# Patient Record
Sex: FEMALE | Race: AMERICAN INDIAN OR ALASKA NATIVE
[De-identification: names, ages, dates, MRNs, and addresses within clinical notes are randomized per-mention and may not be internally consistent; named-entity substitution may affect disease eponyms.]

---

## 2019-03-15 ENCOUNTER — HOSPITAL ENCOUNTER (EMERGENCY)
Dept: HOSPITAL 5 - ED | Age: 34
Discharge: HOME | End: 2019-03-15
Payer: COMMERCIAL

## 2019-03-15 VITALS — SYSTOLIC BLOOD PRESSURE: 109 MMHG | DIASTOLIC BLOOD PRESSURE: 65 MMHG

## 2019-03-15 DIAGNOSIS — J45.909: ICD-10-CM

## 2019-03-15 DIAGNOSIS — K29.70: Primary | ICD-10-CM

## 2019-03-15 DIAGNOSIS — F17.200: ICD-10-CM

## 2019-03-15 LAB
ALBUMIN SERPL-MCNC: 4 G/DL (ref 3.9–5)
ALT SERPL-CCNC: 17 UNITS/L (ref 7–56)
BASOPHILS # (AUTO): 0.1 K/MM3 (ref 0–0.1)
BASOPHILS NFR BLD AUTO: 1.1 % (ref 0–1.8)
BILIRUB UR QL STRIP: (no result)
BLOOD UR QL VISUAL: (no result)
BUN SERPL-MCNC: 14 MG/DL (ref 7–17)
BUN/CREAT SERPL: 18 %
CALCIUM SERPL-MCNC: 8.7 MG/DL (ref 8.4–10.2)
EOSINOPHIL # BLD AUTO: 0.3 K/MM3 (ref 0–0.4)
EOSINOPHIL NFR BLD AUTO: 3.5 % (ref 0–4.3)
HCT VFR BLD CALC: 35.6 % (ref 30.3–42.9)
HEMOLYSIS INDEX: 9
HGB BLD-MCNC: 11.9 GM/DL (ref 10.1–14.3)
LYMPHOCYTES # BLD AUTO: 1.8 K/MM3 (ref 1.2–5.4)
LYMPHOCYTES NFR BLD AUTO: 24.1 % (ref 13.4–35)
MCHC RBC AUTO-ENTMCNC: 34 % (ref 30–34)
MCV RBC AUTO: 78 FL (ref 79–97)
MONOCYTES # (AUTO): 0.8 K/MM3 (ref 0–0.8)
MONOCYTES % (AUTO): 10.9 % (ref 0–7.3)
PH UR STRIP: 5 [PH] (ref 5–7)
PLATELET # BLD: 415 K/MM3 (ref 140–440)
PROT UR STRIP-MCNC: (no result) MG/DL
RBC # BLD AUTO: 4.55 M/MM3 (ref 3.65–5.03)
RBC #/AREA URNS HPF: < 1 /HPF (ref 0–6)
UROBILINOGEN UR-MCNC: < 2 MG/DL (ref ?–2)
WBC #/AREA URNS HPF: 1 /HPF (ref 0–6)

## 2019-03-15 PROCEDURE — 85025 COMPLETE CBC W/AUTO DIFF WBC: CPT

## 2019-03-15 PROCEDURE — 80053 COMPREHEN METABOLIC PANEL: CPT

## 2019-03-15 PROCEDURE — 36415 COLL VENOUS BLD VENIPUNCTURE: CPT

## 2019-03-15 PROCEDURE — 84703 CHORIONIC GONADOTROPIN ASSAY: CPT

## 2019-03-15 PROCEDURE — 99284 EMERGENCY DEPT VISIT MOD MDM: CPT

## 2019-03-15 PROCEDURE — 74022 RADEX COMPL AQT ABD SERIES: CPT

## 2019-03-15 PROCEDURE — 81001 URINALYSIS AUTO W/SCOPE: CPT

## 2019-03-15 NOTE — EMERGENCY DEPARTMENT REPORT
ED Abdominal Pain HPI





- General


Chief Complaint: Abdominal Pain


Stated Complaint: ABD PAIN


Time Seen by Provider: 03/15/19 12:52


Source: patient


Mode of arrival: Ambulatory


Limitations: No Limitations





- History of Present Illness


Initial Comments: 





Patient is a 33-year-old black female who is presenting with some abdominal 

discomfort.  Patient states is mostly in the left upper quadrant and suprapubic 

region.  She states is sensitive to touch as well as laying on her left side.  

She states is crampy in nature off and on.  She denies any nausea vomiting 

diarrhea cough, congestion and fevers chills dysuria and no vaginal discharge or

abnormal vaginal bleeding.


Severity scale (0 -10): 10





- Related Data


                                  Previous Rx's











 Medication  Instructions  Recorded  Last Taken  Type


 


Docusate Sodium [Colace] 100 mg PO BID #60 capsule 11/18/18 Unknown Rx


 


Ferrous Sulfate [Feosol 325 MG tab] 325 mg PO BID #60 tablet 11/18/18 Unknown Rx


 


HYDROcodone/ACETAMINOPHEN [Norco 1 each PO BID PRN #10 tablet 11/18/18 Unknown 

Rx





7.5-325 Tablet]    


 


Ondansetron [Zofran Odt] 4 mg PO Q8HR PRN #15 tab.rapdis 11/18/18 Unknown Rx


 


Famotidine [Pepcid] 40 mg PO QHS #10 tablet 03/15/19 Unknown Rx


 


traMADol [Ultram] 50 mg PO Q6HR PRN #12 tablet 03/15/19 Unknown Rx











                                    Allergies











Allergy/AdvReac Type Severity Reaction Status Date / Time


 


No Known Allergies Allergy   Verified 03/15/19 12:29














ED Review of Systems


ROS: 


Stated complaint: ABD PAIN


Other details as noted in HPI





Comment: All other systems reviewed and negative





ED Past Medical Hx





- Past Medical History


Hx Asthma: Yes


Additional medical history: Ovarian cyst, uterine fibroids, Bilateral Leg 

lymphadema.  Hemorroids





- Surgical History


Additional Surgical History: BREAST REDUCTION





- Social History


Smoking Status: Current Every Day Smoker


Substance Use Type: Alcohol





- Medications


Home Medications: 


                                Home Medications











 Medication  Instructions  Recorded  Confirmed  Last Taken  Type


 


Docusate Sodium [Colace] 100 mg PO BID #60 capsule 11/18/18  Unknown Rx


 


Ferrous Sulfate [Feosol 325 MG tab] 325 mg PO BID #60 tablet 11/18/18  Unknown 

Rx


 


HYDROcodone/ACETAMINOPHEN [Norco 1 each PO BID PRN #10 tablet 11/18/18  Unknown 

Rx





7.5-325 Tablet]     


 


Ondansetron [Zofran Odt] 4 mg PO Q8HR PRN #15 tab.rapdis 11/18/18  Unknown Rx


 


Famotidine [Pepcid] 40 mg PO QHS #10 tablet 03/15/19  Unknown Rx


 


traMADol [Ultram] 50 mg PO Q6HR PRN #12 tablet 03/15/19  Unknown Rx














ED Physical Exam





- General


Limitations: No Limitations


General appearance: alert, in no apparent distress





- Head


Head exam: Present: atraumatic, normocephalic





- Eye


Eye exam: Present: normal appearance





- ENT


ENT exam: Present: mucous membranes moist





- Neck


Neck exam: Present: normal inspection





- Respiratory


Respiratory exam: Present: normal lung sounds bilaterally.  Absent: respiratory 

distress, wheezes, rales, rhonchi





- Cardiovascular


Cardiovascular Exam: Present: regular rate, normal rhythm.  Absent: systolic 

murmur, diastolic murmur, rubs, gallop





- GI/Abdominal


GI/Abdominal exam: Present: soft, tenderness (mild tenderness to palpation to 

the left upper quadrant and epigastrium), normal bowel sounds.  Absent: 

distended, guarding, rebound, rigid





- Extremities Exam


Extremities exam: Present: normal inspection





- Back Exam


Back exam: Present: normal inspection





- Neurological Exam


Neurological exam: Present: alert, oriented X3





- Psychiatric


Psychiatric exam: Present: normal affect, normal mood





- Skin


Skin exam: Present: warm, dry, intact, normal color.  Absent: rash





ED Course





                                   Vital Signs











  03/15/19





  12:29


 


Temperature 97.6 F


 


Pulse Rate 84


 


Respiratory 18





Rate 


 


Blood Pressure 109/65


 


O2 Sat by Pulse 98





Oximetry 














ED Medical Decision Making





- Lab Data


Result diagrams: 


                                 03/15/19 12:55





                                 03/15/19 12:55








                                   Lab Results











  03/15/19 03/15/19 03/15/19 Range/Units





  12:48 12:55 12:55 


 


WBC   7.3   (4.5-11.0)  K/mm3


 


RBC   4.55   (3.65-5.03)  M/mm3


 


Hgb   11.9   (10.1-14.3)  gm/dl


 


Hct   35.6   (30.3-42.9)  %


 


MCV   78 L   (79-97)  fl


 


MCH   26 L   (28-32)  pg


 


MCHC   34   (30-34)  %


 


RDW   16.8 H   (13.2-15.2)  %


 


Plt Count   415   (140-440)  K/mm3


 


Lymph % (Auto)   24.1   (13.4-35.0)  %


 


Mono % (Auto)   10.9 H   (0.0-7.3)  %


 


Eos % (Auto)   3.5   (0.0-4.3)  %


 


Baso % (Auto)   1.1   (0.0-1.8)  %


 


Lymph #   1.8   (1.2-5.4)  K/mm3


 


Mono #   0.8   (0.0-0.8)  K/mm3


 


Eos #   0.3   (0.0-0.4)  K/mm3


 


Baso #   0.1   (0.0-0.1)  K/mm3


 


Seg Neutrophils %   60.4   (40.0-70.0)  %


 


Seg Neutrophils #   4.4   (1.8-7.7)  K/mm3


 


Sodium    136 L  (137-145)  mmol/L


 


Potassium    3.9  (3.6-5.0)  mmol/L


 


Chloride    101.1  ()  mmol/L


 


Carbon Dioxide    23  (22-30)  mmol/L


 


Anion Gap    16  mmol/L


 


BUN    14  (7-17)  mg/dL


 


Creatinine    0.8  (0.7-1.2)  mg/dL


 


Estimated GFR    > 60  ml/min


 


BUN/Creatinine Ratio    18  %


 


Glucose    130 H  ()  mg/dL


 


Calcium    8.7  (8.4-10.2)  mg/dL


 


Total Bilirubin    0.30  (0.1-1.2)  mg/dL


 


AST    24  (5-40)  units/L


 


ALT    17  (7-56)  units/L


 


Alkaline Phosphatase    41  ()  units/L


 


Total Protein    6.9  (6.3-8.2)  g/dL


 


Albumin    4.0  (3.9-5)  g/dL


 


Albumin/Globulin Ratio    1.4  %


 


HCG, Qual     (Negative)  


 


Urine Color  Yellow    (Yellow)  


 


Urine Turbidity  Clear    (Clear)  


 


Urine pH  5.0    (5.0-7.0)  


 


Ur Specific Gravity  1.021    (1.003-1.030)  


 


Urine Protein  <15 mg/dl    (Negative)  mg/dL


 


Urine Glucose (UA)  Neg    (Negative)  mg/dL


 


Urine Ketones  Neg    (Negative)  mg/dL


 


Urine Blood  Neg    (Negative)  


 


Urine Nitrite  Neg    (Negative)  


 


Urine Bilirubin  Neg    (Negative)  


 


Urine Urobilinogen  < 2.0    (<2.0)  mg/dL


 


Ur Leukocyte Esterase  Neg    (Negative)  


 


Urine WBC (Auto)  1.0    (0.0-6.0)  /HPF


 


Urine RBC (Auto)  < 1.0    (0.0-6.0)  /HPF


 


U Epithel Cells (Auto)  1.0    (0-13.0)  /HPF














  03/15/19 Range/Units





  12:55 


 


WBC   (4.5-11.0)  K/mm3


 


RBC   (3.65-5.03)  M/mm3


 


Hgb   (10.1-14.3)  gm/dl


 


Hct   (30.3-42.9)  %


 


MCV   (79-97)  fl


 


MCH   (28-32)  pg


 


MCHC   (30-34)  %


 


RDW   (13.2-15.2)  %


 


Plt Count   (140-440)  K/mm3


 


Lymph % (Auto)   (13.4-35.0)  %


 


Mono % (Auto)   (0.0-7.3)  %


 


Eos % (Auto)   (0.0-4.3)  %


 


Baso % (Auto)   (0.0-1.8)  %


 


Lymph #   (1.2-5.4)  K/mm3


 


Mono #   (0.0-0.8)  K/mm3


 


Eos #   (0.0-0.4)  K/mm3


 


Baso #   (0.0-0.1)  K/mm3


 


Seg Neutrophils %   (40.0-70.0)  %


 


Seg Neutrophils #   (1.8-7.7)  K/mm3


 


Sodium   (137-145)  mmol/L


 


Potassium   (3.6-5.0)  mmol/L


 


Chloride   ()  mmol/L


 


Carbon Dioxide   (22-30)  mmol/L


 


Anion Gap   mmol/L


 


BUN   (7-17)  mg/dL


 


Creatinine   (0.7-1.2)  mg/dL


 


Estimated GFR   ml/min


 


BUN/Creatinine Ratio   %


 


Glucose   ()  mg/dL


 


Calcium   (8.4-10.2)  mg/dL


 


Total Bilirubin   (0.1-1.2)  mg/dL


 


AST   (5-40)  units/L


 


ALT   (7-56)  units/L


 


Alkaline Phosphatase   ()  units/L


 


Total Protein   (6.3-8.2)  g/dL


 


Albumin   (3.9-5)  g/dL


 


Albumin/Globulin Ratio   %


 


HCG, Qual  Negative  (Negative)  


 


Urine Color   (Yellow)  


 


Urine Turbidity   (Clear)  


 


Urine pH   (5.0-7.0)  


 


Ur Specific Gravity   (1.003-1.030)  


 


Urine Protein   (Negative)  mg/dL


 


Urine Glucose (UA)   (Negative)  mg/dL


 


Urine Ketones   (Negative)  mg/dL


 


Urine Blood   (Negative)  


 


Urine Nitrite   (Negative)  


 


Urine Bilirubin   (Negative)  


 


Urine Urobilinogen   (<2.0)  mg/dL


 


Ur Leukocyte Esterase   (Negative)  


 


Urine WBC (Auto)   (0.0-6.0)  /HPF


 


Urine RBC (Auto)   (0.0-6.0)  /HPF


 


U Epithel Cells (Auto)   (0-13.0)  /HPF














- Radiology Data


Radiology results: report reviewed (x-ray of the abdomen shows no acute process)





- Medical Decision Making





Patient's laboratory studies and urinalysis showed no acute process.  Patient 

likely with gastritis or peptic ulcer disease.  Patient given this was 

symptomatically be discharged home.


Critical care attestation.: 


If time is entered above; I have spent that time in minutes in the direct care 

of this critically ill patient, excluding procedure time.








ED Disposition


Clinical Impression: 


Gastritis


Qualifiers:


 Gastritis type: unspecified gastritis Chronicity: acute Gastritis bleeding: 

without bleeding Qualified Code(s): K29.00 - Acute gastritis without bleeding





Abdominal pain


Qualifiers:


 Abdominal location: left upper quadrant Qualified Code(s): R10.12 - Left upper 

quadrant pain





Disposition: DC-01 TO HOME OR SELFCARE


Is pt being admited?: No


Does the pt Need Aspirin: No


Condition: Stable


Instructions:  Abdominal Pain (ED)


Referrals: 


[facility] MD [Primary Care Provider] - 3-5 Days


KAYLEE HANNON MD [Staff Physician] - 3-5 Days


Time of Disposition: 14:50

## 2019-03-15 NOTE — XRAY REPORT
ABDOMINAL SERIES



INDICATION: LUQ pain.



COMPARISON: None similar.



FINDINGS: Abdominal series, three radiographs, demonstrate 

nonobstructive bowel gas pattern without focal suspicious 

calcifications, pneumatosis or pneumoperitoneum.



Accompanying chest radiograph demonstrates slight exaggerated heart 

size.  Normal mediastinal and hilar contours. Clear lungs.



No acute osseous process.



CONCLUSION: No acute radiographic abnormality, as described.



Thank you for the opportunity to participate in this patient's care.

## 2019-07-04 ENCOUNTER — HOSPITAL ENCOUNTER (EMERGENCY)
Dept: HOSPITAL 5 - ED | Age: 34
Discharge: HOME | End: 2019-07-04
Payer: COMMERCIAL

## 2019-07-04 VITALS — DIASTOLIC BLOOD PRESSURE: 69 MMHG | SYSTOLIC BLOOD PRESSURE: 110 MMHG

## 2019-07-04 DIAGNOSIS — D25.9: ICD-10-CM

## 2019-07-04 DIAGNOSIS — N93.8: Primary | ICD-10-CM

## 2019-07-04 DIAGNOSIS — F17.200: ICD-10-CM

## 2019-07-04 DIAGNOSIS — J45.909: ICD-10-CM

## 2019-07-04 DIAGNOSIS — Z87.19: ICD-10-CM

## 2019-07-04 DIAGNOSIS — Z79.899: ICD-10-CM

## 2019-07-04 DIAGNOSIS — N83.209: ICD-10-CM

## 2019-07-04 LAB
BILIRUB UR QL STRIP: (no result)
BLOOD UR QL VISUAL: (no result)
BUN SERPL-MCNC: 11 MG/DL (ref 7–17)
BUN/CREAT SERPL: 18 %
CALCIUM SERPL-MCNC: 8.9 MG/DL (ref 8.4–10.2)
HCT VFR BLD CALC: 28.8 % (ref 30.3–42.9)
HEMOLYSIS INDEX: 0
HGB BLD-MCNC: 9.4 GM/DL (ref 10.1–14.3)
MCHC RBC AUTO-ENTMCNC: 33 % (ref 30–34)
MCV RBC AUTO: 63 FL (ref 79–97)
PH UR STRIP: 8 [PH] (ref 5–7)
PLATELET # BLD: 651 K/MM3 (ref 140–440)
PROT UR STRIP-MCNC: (no result) MG/DL
RBC # BLD AUTO: 4.55 M/MM3 (ref 3.65–5.03)
RBC #/AREA URNS HPF: 2 /HPF (ref 0–6)
UROBILINOGEN UR-MCNC: < 2 MG/DL (ref ?–2)
WBC #/AREA URNS HPF: < 1 /HPF (ref 0–6)

## 2019-07-04 PROCEDURE — 81001 URINALYSIS AUTO W/SCOPE: CPT

## 2019-07-04 PROCEDURE — 85027 COMPLETE CBC AUTOMATED: CPT

## 2019-07-04 PROCEDURE — 36415 COLL VENOUS BLD VENIPUNCTURE: CPT

## 2019-07-04 PROCEDURE — 80048 BASIC METABOLIC PNL TOTAL CA: CPT

## 2019-07-04 PROCEDURE — 84702 CHORIONIC GONADOTROPIN TEST: CPT

## 2019-07-04 NOTE — EMERGENCY DEPARTMENT REPORT
ED General Adult HPI





- General


Chief complaint: Abdominal Pain


Stated complaint: LOWER ABDOMINAL PAIN


Time Seen by Provider: 07/04/19 20:02


Source: patient


Mode of arrival: Ambulatory


Limitations: No Limitations





- History of Present Illness


Initial comments: 





Patient is a nulliparous 34-year-old -American female with no past 

medical history except asthma and presents to the ED with complaint of 

persistent vaginal discharge and pelvic pain for over 3 months.  Patient states 

that her OB/GYN physician started her on a birth control pill to help regulate 

her menstrual cycle over 3 months ago but states that the symptoms are 

persistent.  Patient states that she also has a history of uterine fibroids that

have persistently made her menstrual cycle irregular.  Patient denies abdominal 

pain, nausea, vomiting, diarrhea, dysuria, urinary frequency and urgency, 

vaginal discharge or low back pain and dizziness.


MD Complaint: pelvic pain, vaginal bleeding


-: Gradual, month(s) (> 3 months)


Location: abdomen, pelvis


Radiation: non-radiation


Severity scale (0 -10): 5


Quality: aching, sharp


Consistency: constant


Improves with: none


Worsens with: none


Associated Symptoms: denies other symptoms.  denies: confusion, chest pain, 

diaphoresis, fever/chills, headaches, malaise, nausea/vomiting, rash, shortness 

of breath, syncope, weakness


Treatments Prior to Arrival: none





- Related Data


                                  Previous Rx's











 Medication  Instructions  Recorded  Last Taken  Type


 


Docusate Sodium [Colace] 100 mg PO BID #60 capsule 11/18/18 Unknown Rx


 


Ferrous Sulfate [Feosol 325 MG tab] 325 mg PO BID #60 tablet 11/18/18 Unknown Rx


 


HYDROcodone/ACETAMINOPHEN [Norco 1 each PO BID PRN #10 tablet 11/18/18 Unknown 

Rx





7.5-325 Tablet]    


 


Famotidine [Pepcid] 40 mg PO QHS #10 tablet 03/15/19 Unknown Rx


 


Ondansetron [Zofran ODT TAB] 4 mg PO Q8HR PRN #15 tab.rapdis 07/04/19 Unknown Rx


 


traMADol [Ultram 50 MG tab] 50 mg PO Q6HR PRN #12 tablet 07/04/19 Unknown Rx











                                    Allergies











Allergy/AdvReac Type Severity Reaction Status Date / Time


 


No Known Allergies Allergy   Verified 03/15/19 12:29














ED Review of Systems


ROS: 


Stated complaint: LOWER ABDOMINAL PAIN


Other details as noted in HPI





Constitutional: denies: chills, fever


Eyes: denies: eye pain, eye discharge, vision change


ENT: denies: ear pain, throat pain


Respiratory: denies: cough, shortness of breath, wheezing


Cardiovascular: denies: chest pain, palpitations


Endocrine: no symptoms reported


Gastrointestinal: abdominal pain (LOWER ABDOMINAL PAIN), nausea.  denies: 

diarrhea


Genitourinary: hematuria, abnormal menses, other (Vaginal bleeding).  denies: 

urgency, dysuria, discharge


Musculoskeletal: denies: back pain, joint swelling, arthralgia


Skin: denies: rash, lesions


Neurological: denies: headache, weakness, paresthesias


Psychiatric: denies: anxiety, depression


Hematological/Lymphatic: denies: easy bleeding, easy bruising





ED Past Medical Hx





- Past Medical History


Previous Medical History?: Yes


Hx Asthma: Yes


Additional medical history: Ovarian cyst, uterine fibroids, Bilateral Leg 

lymphadema.  Hemorroids





- Surgical History


Past Surgical History?: Yes


Additional Surgical History: BREAST REDUCTION





- Social History


Smoking Status: Current Some Day Smoker


Substance Use Type: None





- Medications


Home Medications: 


                                Home Medications











 Medication  Instructions  Recorded  Confirmed  Last Taken  Type


 


Docusate Sodium [Colace] 100 mg PO BID #60 capsule 11/18/18  Unknown Rx


 


Ferrous Sulfate [Feosol 325 MG tab] 325 mg PO BID #60 tablet 11/18/18  Unknown 

Rx


 


HYDROcodone/ACETAMINOPHEN [Norco 1 each PO BID PRN #10 tablet 11/18/18  Unknown 

Rx





7.5-325 Tablet]     


 


Famotidine [Pepcid] 40 mg PO QHS #10 tablet 03/15/19  Unknown Rx


 


Ondansetron [Zofran ODT TAB] 4 mg PO Q8HR PRN #15 tab.rapdis 07/04/19  Unknown 

Rx


 


traMADol [Ultram 50 MG tab] 50 mg PO Q6HR PRN #12 tablet 07/04/19  Unknown Rx














ED Physical Exam





- General


Limitations: No Limitations


General appearance: alert, in no apparent distress





- Head


Head exam: Present: atraumatic, normocephalic, normal inspection





- Eye


Eye exam: Present: normal appearance, PERRL, EOMI





- ENT


ENT exam: Present: normal exam, normal orophraynx, mucous membranes moist, TM's 

normal bilaterally, normal external ear exam





- Neck


Neck exam: Present: normal inspection, full ROM





- Respiratory


Respiratory exam: Present: normal lung sounds bilaterally.  Absent: respiratory 

distress, wheezes, rhonchi, chest wall tenderness, accessory muscle use, 

decreased breath sounds, prolonged expiratory





- Cardiovascular


Cardiovascular Exam: Present: regular rate, normal rhythm, normal heart sounds. 

Absent: systolic murmur, diastolic murmur, rubs, gallop





- GI/Abdominal


GI/Abdominal exam: Present: soft, normal bowel sounds.  Absent: tenderness, 

guarding, rebound, hyperactive bowel sounds, organomegaly





- Rectal


Rectal exam: Present: deferred





- 


Bi-manual exam: Present: other (Deferred, patient declined pelvic exam)





- Extremities Exam


Extremities exam: Present: normal inspection, full ROM, normal capillary refill





- Back Exam


Back exam: Present: normal inspection, full ROM.  Absent: tenderness, muscle 

spasm, paraspinal tenderness, vertebral tenderness





- Neurological Exam


Neurological exam: Present: alert, oriented X3, CN II-XII intact, normal gait, 

reflexes normal





- Psychiatric


Psychiatric exam: Present: normal affect, normal mood





- Skin


Skin exam: Present: warm, dry, intact, normal color.  Absent: rash





ED Course





                                   Vital Signs











  07/04/19 07/04/19





  20:02 21:06


 


Temperature 98.1 F 


 


Pulse Rate 85 


 


Respiratory 16 16





Rate  


 


Blood Pressure 110/69 


 


O2 Sat by Pulse 100 





Oximetry  














- Reevaluation(s)


Reevaluation #1: 





07/04/19 21:52


Patient is alert and oriented 3 and is not in distress, pleasant during the 

physical exam and hemodynamically stable.  Lab test results were reviewed and 

are unremarkable.  Patient was treated for pain in the ED and on reevaluation, 

patient's pain is well controlled with medications.  Patient was discharged home

on medications for pain and advised to follow up with a joint decision has 

scheduled in the next 2 weeks for further evaluation.  Patient advised to return

to the ED immediately if symptoms get worse.





ED Medical Decision Making





- Lab Data


Result diagrams: 


                                 07/04/19 20:16





                                 07/04/19 20:16





- Medical Decision Making





Patient is alert and oriented 3 and is not in distress, pleasant during the 

physical exam and hemodynamically stable.  Lab test results were reviewed and 

are unremarkable.  Patient was treated for pain in the ED and on reevaluation, 

patient's pain is well controlled with medications.  Patient's symptoms are 

chronic and she has an appointment with her [facility] physician in 2 weeks for a 

possible uterine ablation procedure. Patient was discharged home on medications 

for pain and advised to follow up with a joint decision has scheduled in the 

next 2 weeks for further evaluation.  Patient advised to return to the ED 

immediately if symptoms get worse.





- Differential Diagnosis


Dysfunctional uterine bleeding; Acute UTI; PID, Uterine Fibroids


Critical care attestation.: 


If time is entered above; I have spent that time in minutes in the direct care 

of this critically ill patient, excluding procedure time.








ED Disposition


Clinical Impression: 


 Dysfunctional uterine bleeding





Abdominal pain


Qualifiers:


 Abdominal location: lower abdomen, unspecified Qualified Code(s): R10.30 - 

Lower abdominal pain, unspecified





Uterine fibroid


Qualifiers:


 Uterine leiomyoma location: unspecified location Qualified Code(s): D25.9 - 

Leiomyoma of uterus, unspecified





Disposition: DC-01 TO HOME OR SELFCARE


Is pt being admited?: No


Does the pt Need Aspirin: No


Condition: Stable


Instructions:  Abdominal Pain (ED), Dysfunctional Uterine Bleeding (ED), Uterine

Fibroids (ED)


Additional Instructions: 


Take medications for pain as needed with food, drink plenty of fluids and foll

ow-up with the OB/GYN physician as scheduled.  Return to the ED immediately if 

symptoms get worse.


Prescriptions: 


traMADol [Ultram 50 MG tab] 50 mg PO Q6HR PRN #12 tablet


 PRN Reason: Pain


Ondansetron [Zofran ODT TAB] 4 mg PO Q8HR PRN #15 tab.rapdis


 PRN Reason: Nausea And Vomiting


Referrals: 


[facility] [Outside] - 3-5 Days


Time of Disposition: 21:49


Print Language: ENGLISH

## 2019-07-04 NOTE — EVENT NOTE
ED Screening Note


ED Screening Note: 





irregular vag bleeding





lower abd pain - bilateral





painful sex





hx fibroids that she needs surgery for


she has MD in [place]


on hormones- estrogen














This initial assessment/diagnostic orders/clinical plan/treatment(s) is/are 

subject to change based on patients health status, clinical progression and 

re-assessment by fellow clinical providers in the ED. Further treatment and 

workup at subsequent clinical providers discretion. Patient/guardian urged not 

to elope from the ED as their condition may be serious if not clinically 

assessed and managed. 





Initial orders include: 


urine


labs